# Patient Record
Sex: MALE | Race: WHITE | NOT HISPANIC OR LATINO | Employment: PART TIME | URBAN - METROPOLITAN AREA
[De-identification: names, ages, dates, MRNs, and addresses within clinical notes are randomized per-mention and may not be internally consistent; named-entity substitution may affect disease eponyms.]

---
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
## 2024-07-23 ENCOUNTER — ANCILLARY PROCEDURE (OUTPATIENT)
Dept: GENERAL RADIOLOGY | Facility: CLINIC | Age: 19
End: 2024-07-23
Attending: PHYSICIAN ASSISTANT
Payer: OTHER MISCELLANEOUS

## 2024-07-23 ENCOUNTER — OFFICE VISIT (OUTPATIENT)
Dept: URGENT CARE | Facility: URGENT CARE | Age: 19
End: 2024-07-23
Payer: OTHER MISCELLANEOUS

## 2024-07-23 VITALS
SYSTOLIC BLOOD PRESSURE: 118 MMHG | WEIGHT: 160 LBS | HEART RATE: 88 BPM | HEIGHT: 71 IN | OXYGEN SATURATION: 99 % | BODY MASS INDEX: 22.4 KG/M2 | RESPIRATION RATE: 16 BRPM | DIASTOLIC BLOOD PRESSURE: 80 MMHG | TEMPERATURE: 97.7 F

## 2024-07-23 DIAGNOSIS — S99.912A ANKLE INJURY, LEFT, INITIAL ENCOUNTER: Primary | ICD-10-CM

## 2024-07-23 PROCEDURE — 99203 OFFICE O/P NEW LOW 30 MIN: CPT | Performed by: PHYSICIAN ASSISTANT

## 2024-07-23 PROCEDURE — 73610 X-RAY EXAM OF ANKLE: CPT | Mod: TC | Performed by: RADIOLOGY

## 2024-07-23 PROCEDURE — 73630 X-RAY EXAM OF FOOT: CPT | Mod: TC | Performed by: RADIOLOGY

## 2024-07-23 RX ORDER — ACETAMINOPHEN 500 MG
1000 TABLET ORAL ONCE
Status: COMPLETED | OUTPATIENT
Start: 2024-07-23 | End: 2024-07-23

## 2024-07-23 RX ADMIN — Medication 1000 MG: at 11:36

## 2024-07-23 NOTE — LETTER
[facility] [place]  [address]  [facility] [address]  Phone: [phone]  Fax: [phone]    July 23, 2024        Damion FELIPE Derian  [address] [facility] [address]          To whom it may concern:    RE: Damion Menard    He is excused from work for 7/23/2024 - 7/30/2024.  May return sooner as tolerated.    Please contact me for questions or concerns.      Sincerely,      Harvinder Duong

## 2024-07-23 NOTE — PATIENT INSTRUCTIONS
Tylenol (acetaminophen) 500-1000mg for the first 3 hours, then ibuprofen 400-600mg the next 3 hours, and alternate as needed.    You were seen today for a(n) ankle sprain. The x-ray showed no signs of a bone fracture.    Symptom management:  - Rest the injured site  - Ice pads applied 10-15 minutes up to every hour as needed  - Compression with light bandages or brace  - Elevation of the affected area above the chest  - May use ibuprofen to help with swelling and discomfort    If no improvement in symptoms in 1 week, recommend follow-up with your primary care provider for further evaluation.    Reasons to return sooner for re-evaluation:  - Numbness or tingling develops around the site of injury  - Develop severe or worsening pain  - Area becomes blue or cold to the touch

## 2024-07-23 NOTE — PROGRESS NOTES
"  Assessment & Plan:      Problem List Items Addressed This Visit    None  Visit Diagnoses       Ankle injury, left, initial encounter    -  Primary    Relevant Medications    acetaminophen (TYLENOL) tablet 1,000 mg (Start on 7/23/2024 12:00 PM)    Other Relevant Orders    XR Ankle Left G/E 3 Views    XR Foot Left G/E 3 Views          Medical Decision Making  Patient presents for acute injury to the left ankle.  X-rays are negative for acute fracture in the left ankle and foot.  Symptoms consistent with a sprain of the lateral malleolus.  Will provide patient with crutches and Ace wrap.  Continue with cold compresses, rest, and over-the-counter analgesics as needed.  Provided note for work.  Discussed treatment and symptomatic care.  Allergies and medication interactions reviewed.  Discussed signs of worsening symptoms and when to follow-up with PCP if no symptom improvement.     Subjective:      History provided by the patient.  He is also here with his father.  Damion Menard is a 18 year old male here for evaluation of left ankle injury.  Patient was at work on a ladder when he fell off about 5 feet onto a carpeted floor.  Patient has not been able to put any weight on the left foot since then.     The following portions of the patient's history were reviewed and updated as appropriate: allergies, current medications, and problem list.     Review of Systems  Pertinent items are noted in HPI.    Allergies  No Known Allergies    Family History   Problem Relation Age of Onset    Depression Mother     Suicide Mother     Anxiety Disorder Father     Suicide Father     Depression Maternal Grandmother     Coronary Artery Disease Maternal Grandmother        Social History     Tobacco Use    Smoking status: Never    Smokeless tobacco: Never   Substance Use Topics    Alcohol use: Not on file        Objective:      /80   Pulse 88   Temp 97.7  F (36.5  C) (Temporal)   Resp 16   Ht 1.803 m (5' 11\")   Wt 72.6 kg " (160 lb)   SpO2 99%   BMI 22.32 kg/m    General appearance - alert, well appearing, and in no distress and non-toxic  Extremities - left lower extremity: Tenderness to palpation across the lateral malleolus; no range movement through the left ankle due to pain  Skin - left lower extremity: Swelling and ecchymosis over the lateral malleolus, skin otherwise intact     Lab & Imaging Results    No results found for any visits on 07/23/24.    I personally reviewed these results and discussed findings with the patient.    The use of Dragon/[employer] dictation services was used to construct the content of this note; any grammatical errors are non-intentional. Please contact the author directly if you are in need of any clarification.
